# Patient Record
Sex: MALE | Race: WHITE | NOT HISPANIC OR LATINO | URBAN - METROPOLITAN AREA
[De-identification: names, ages, dates, MRNs, and addresses within clinical notes are randomized per-mention and may not be internally consistent; named-entity substitution may affect disease eponyms.]

---

## 2017-02-06 ENCOUNTER — OFFICE VISIT - HEALTHEAST (OUTPATIENT)
Dept: INTERNAL MEDICINE | Facility: CLINIC | Age: 77
End: 2017-02-06

## 2017-02-06 ENCOUNTER — AMBULATORY - HEALTHEAST (OUTPATIENT)
Dept: INTERNAL MEDICINE | Facility: CLINIC | Age: 77
End: 2017-02-06

## 2017-02-06 DIAGNOSIS — E78.5 HYPERLIPEMIA: ICD-10-CM

## 2017-02-06 DIAGNOSIS — Z12.5 SPECIAL SCREENING FOR MALIGNANT NEOPLASM OF PROSTATE: ICD-10-CM

## 2017-02-06 DIAGNOSIS — D64.9 ANEMIA, UNSPECIFIED: ICD-10-CM

## 2017-02-06 DIAGNOSIS — Z00.00 MEDICARE ANNUAL WELLNESS VISIT, INITIAL: ICD-10-CM

## 2017-02-06 DIAGNOSIS — Z79.899 ENCOUNTER FOR LONG-TERM (CURRENT) USE OF OTHER MEDICATIONS: ICD-10-CM

## 2017-02-06 DIAGNOSIS — F43.23 ADJUSTMENT DISORDER WITH MIXED ANXIETY AND DEPRESSED MOOD: ICD-10-CM

## 2017-02-06 DIAGNOSIS — R91.8 ABNORMALITY OF LUNG ON CXR: ICD-10-CM

## 2017-02-06 DIAGNOSIS — K21.9 ESOPHAGEAL REFLUX: ICD-10-CM

## 2017-02-06 DIAGNOSIS — F41.1 ANXIETY STATE: ICD-10-CM

## 2017-02-06 LAB
CHOLEST SERPL-MCNC: 202 MG/DL
FASTING STATUS PATIENT QL REPORTED: NO
HDLC SERPL-MCNC: 79 MG/DL
LDLC SERPL CALC-MCNC: 105 MG/DL
PSA SERPL-MCNC: 23.7 NG/ML (ref 0–6.5)
TRIGL SERPL-MCNC: 89 MG/DL

## 2017-02-06 ASSESSMENT — MIFFLIN-ST. JEOR: SCORE: 1418.87

## 2017-02-07 ENCOUNTER — COMMUNICATION - HEALTHEAST (OUTPATIENT)
Dept: INTERNAL MEDICINE | Facility: CLINIC | Age: 77
End: 2017-02-07

## 2017-02-12 ENCOUNTER — COMMUNICATION - HEALTHEAST (OUTPATIENT)
Dept: INTERNAL MEDICINE | Facility: CLINIC | Age: 77
End: 2017-02-12

## 2017-02-13 ENCOUNTER — COMMUNICATION - HEALTHEAST (OUTPATIENT)
Dept: INTERNAL MEDICINE | Facility: CLINIC | Age: 77
End: 2017-02-13

## 2017-02-13 DIAGNOSIS — F43.22 ADJUSTMENT DISORDER WITH ANXIETY: ICD-10-CM

## 2017-02-16 ENCOUNTER — COMMUNICATION - HEALTHEAST (OUTPATIENT)
Dept: INTERNAL MEDICINE | Facility: CLINIC | Age: 77
End: 2017-02-16

## 2017-02-16 DIAGNOSIS — F43.22 ADJUSTMENT DISORDER WITH ANXIETY: ICD-10-CM

## 2017-02-16 RX ORDER — CLONAZEPAM 0.5 MG/1
0.5 TABLET ORAL 2 TIMES DAILY PRN
Qty: 60 TABLET | Refills: 0 | Status: SHIPPED | OUTPATIENT
Start: 2017-02-16

## 2017-02-18 ENCOUNTER — COMMUNICATION - HEALTHEAST (OUTPATIENT)
Dept: INTERNAL MEDICINE | Facility: CLINIC | Age: 77
End: 2017-02-18

## 2017-02-26 ENCOUNTER — AMBULATORY - HEALTHEAST (OUTPATIENT)
Dept: INTERNAL MEDICINE | Facility: CLINIC | Age: 77
End: 2017-02-26

## 2017-05-13 ENCOUNTER — COMMUNICATION - HEALTHEAST (OUTPATIENT)
Dept: INTERNAL MEDICINE | Facility: CLINIC | Age: 77
End: 2017-05-13

## 2017-05-13 DIAGNOSIS — K21.9 ESOPHAGEAL REFLUX: ICD-10-CM

## 2017-05-29 ENCOUNTER — COMMUNICATION - HEALTHEAST (OUTPATIENT)
Dept: INTERNAL MEDICINE | Facility: CLINIC | Age: 77
End: 2017-05-29

## 2017-05-30 ENCOUNTER — RECORDS - HEALTHEAST (OUTPATIENT)
Dept: ADMINISTRATIVE | Facility: OTHER | Age: 77
End: 2017-05-30

## 2021-05-30 ENCOUNTER — RECORDS - HEALTHEAST (OUTPATIENT)
Dept: ADMINISTRATIVE | Facility: CLINIC | Age: 81
End: 2021-05-30

## 2021-05-30 VITALS — WEIGHT: 154.9 LBS | HEIGHT: 70 IN | BODY MASS INDEX: 22.18 KG/M2

## 2021-06-08 NOTE — PROGRESS NOTES
Assessment and Plan:     1. Medicare annual wellness visit, initial     2. Abnormality of lung on CXR  XR Chest PA and Lateral   3. Hyperlipemia  Lipid Cascade   4. Anxiety     5. Esophageal reflux     6. Anemia     7. Adjustment Disorder With Anxiety And Depressed Mood     8. Encounter for long-term (current) use of other medications  Comprehensive Metabolic Panel    HM2(CBC w/o Differential)   9. Special screening for malignant neoplasm of prostate  PSA (Prostatic-Specific Antigen), Annual Screen         The patient's current medical problems were reviewed.      The following health maintenance schedule was reviewed with the patient and provided in printed form in the after visit summary:   Health Maintenance   Topic Date Due     ADVANCE DIRECTIVES DISCUSSED WITH PATIENT  10/04/2017     FALL RISK ASSESSMENT  02/06/2018     COLONOSCOPY  06/17/2023     TD 18+ HE  01/25/2026     PNEUMOCOCCAL POLYSACCHARIDE VACCINE AGE 65 AND OVER  Completed     INFLUENZA VACCINE RULE BASED  Completed     PNEUMOCOCCAL CONJUGATE VACCINE FOR ADULTS (PCV13 OR PREVNAR)  Completed     ZOSTER VACCINE  Completed        Subjective:   Chief Complaint: William Bear is an 76 y.o. male here for an Annual Wellness visit.   HPI:    1.  Health maintenance - immunizations are up-to-date.  Colonoscopy issues more complicated so see problem #2 below.    2.  Personal history of colon polyps - in 2003 the patient had a colonoscopy which was extensively short colon polyps.  In 2008 and then in June 2013 he had 2 additional colonoscopies which have not shown polyps.  Therefore he needs to [facility] to see for certain if he had an adenomatous polyp in 2003.  At issue is whether he should continue colonoscopies beyond age 76 or not.  We discussed the rationale for continuing or not continuing.    3.  Major depression poorly controlled  -I do not follow this personally.  Dr. Dumas is a psychiatrist and they have  tried a host of options.  Nothing seems to be effective and he will be seeing his psychiatrist next week.    4.  Essential tremor - the patient was given propranolol to use on a when necessary basis by his psychiatrist and that is appropriate.    5.  Chronic cough and abnormal chest x-ray - couple months ago he had equivocal left upper lobe infiltrate versus mass a repeat chest x-ray will needed to be done.  We did it today and reviewed the chest x-ray together.  My interpretation is that his chest x-ray is clear.  However my interpretation is unofficial until we get the report from [facility].  If they feel that he still has infiltrate that he will need a CT of the chest.    6.  Alcohol overuse - on his questionnaire he specified that he had 3-4 alcoholic beverages per day.  It's actually more like 2 1/5 alcoholic beverages per day.  He has a bottle of beer every day.  He has a glass of wine is maybe 6 or 7 ounces as opposed to the standard 5 ounce poor.  Therefore he is having about 2-1/4 drinks per day.  Given his depression this may be a negative factor.  This will be the outside limited what would you do generally considered acceptable on a long-term basis.    7.  GERD - patient continues to take his omeprazole and does not identify classic reflux.  Therefore were not certain that his chronic cough is related to reflux.  He has stopped his Flonase nasal spray which could've been a contributing factor to his cough.       Review of Systems:  Please see above.  The rest of the review of systems are negative for all systems.    Patient Care Team:  Irwin King MD as PCP - General     Patient Active Problem List   Diagnosis     Esophageal Reflux     Anemia     Anxiety     Male Erectile Disorder Due To Physical Condition     Adjustment Disorder With Anxiety And Depressed Mood     Hyperlipemia     Past Medical History:   Diagnosis Date     Adjustment disorder with mixed anxiety and depressed mood      Anemia       Anxiety      Basal Cell Carcinoma Of The Skin     Created by Conversion      Benign tubular adenoma of large intestine      Benign Tubular Adenoma Of The Large Intestine     Created by Conversion      ED (erectile dysfunction)      Elevated PSA      Esophageal reflux      Glaucoma      Hyperlipidemia      Hypertension      Inguinal Hernia     Created by Conversion      Macular degeneration      Serology Prostate-specific Antigen (PSA) Elevated     Created by Conversion [facility] Annotation: Dec  5 2007  1:23PM - Irwin King: bx = benign       Past Surgical History:   Procedure Laterality Date     MO BIOPSY OF PROSTATE,NEEDLE/PUNCH      Description: Biopsy Of The Prostate Needle;  Recorded: 08/28/2008;     MO LAMNOTMY INCL W/DCMPRSN NRV ROOT 1 INTRSPC LUMBR      Description: Hemilaminectomy With Disc Removal One Lumbar Interspace;  Recorded: 08/28/2008;  Comments: L3-4     MO REMOVE TONSILS/ADENOIDS,<11 Y/O      Description: Tonsillectomy With Adenoidectomy;  Recorded: 08/28/2008;     TRANSURETHRAL RESECTION OF PROSTATE        No family history on file.   Social History     Social History     Marital status: Single     Spouse name: N/A     Number of children: N/A     Years of education: N/A     Occupational History     Not on file.     Social History Main Topics     Smoking status: Former Smoker     Quit date: 2/8/1975     Smokeless tobacco: Not on file     Alcohol use 1.2 oz/week     1 Glasses of wine, 1 Cans of beer per week      Comment: Daily     Drug use: No     Sexual activity: Not on file     Other Topics Concern     Not on file     Social History Narrative      Current Outpatient Prescriptions   Medication Sig Dispense Refill     atorvastatin (LIPITOR) 20 MG tablet One tablet daily at bedtime 90 tablet 3     cholecalciferol, vitamin D3, 1,000 unit tablet Take 1,000 Units by mouth daily.       clonazePAM (KLONOPIN) 0.5 MG tablet TAKE 1 TABLET BY MOUTH TWICE DAILY AS NEEDED 60 tablet 5     fluticasone  "(FLONASE) 50 mcg/actuation nasal spray 2 sprays each nostril daily 16 g 12     mirtazapine (REMERON) 15 MG tablet TAKE 1 TABLET BY MOUTH EVERY DAY 90 tablet 3     MULTIVITAMIN ORAL Take 1 tablet by mouth daily.       omega-3 fatty acids-vitamin E (FISH OIL) 1,000 mg cap Take 1 capsule by mouth daily.       omeprazole (PRILOSEC) 20 MG capsule TAKE ONE CAPSULE BY MOUTH DAILY 90 capsule 3     sildenafil (VIAGRA) 50 MG tablet Take 1 tablet (50 mg total) by mouth daily as needed. 10 tablet 12     travoprost, benzalkonium, (TRAVATAN) 0.004 % ophthalmic solution 1 drop bedtime.       venlafaxine (EFFEXOR) 75 MG tablet Take 75 mg by mouth 2 (two) times a day.       VIT A/VIT C/VIT E/ZINC/COPPER (ICAPS AREDS ORAL) Take 1 capsule by mouth 2 (two) times a day.        No current facility-administered medications for this visit.       Objective:   Vital Signs:   Visit Vitals     Ht 5' 10\" (1.778 m)     Wt 154 lb 14.4 oz (70.3 kg)     BMI 22.23 kg/m2        VisionScreening:   Visual Acuity Screening    Right eye Left eye Both eyes   Without correction:      With correction: 10/20 10/30 10/15        PHYSICAL EXAM:    EYES: Eyelids, conjunctiva, and sclera were normal. Pupils were normal.  HEAD, EARS, NOSE, MOUTH, AND THROAT: Head and face were normal. Hearing was normal to voice and the ears were normal to exam. Nose appearance was normal and there was no discharge. Oropharynx was normal.  NECK: Neck appearance was normal. There was no cervical adenopathy.  RESPIRATORY: Breathing pattern was normal and the chest moved symmetrically.  Lungs were clear to auscultaion without rales or wheezing.   CARDIOVASCULAR: Heart rate and rhythm were normal.  S1 and S2 were normal and there were no extra sounds or murmurs.   GASTROINTESTINAL: The abdomen was normal in contour.  Bowel sounds were present.  No hepatomegaly or splenomegaly. No localized tenderness, rebound or guarding.  MUSCULOSKELETAL: Skeletal configuration was normal and muscle " mass was normal for age. Joint appearance was overall normal.  LYMPHATIC: There were no enlarged nodes.  SKIN/HAIR/NAILS: Skin color was normal.  There were no skin lesions.  Hair and nails were normal.  EXTREMITIES: No peripheral edema. DP/PT pulses were normal.  NEUROLOGIC: The patient was alert and oriented to person, place, time, and circumstance. Speech was normal. Cranial nerves were normal. Motor strength was normal for age. The patient was normally coordinated.  PSYCHIATRIC:  Mood and affect were normal and the patient had normal recent and remote memory. The patient's judgment and insight were normal.  GENITALIA: Normal circumcised male. No evidence of inguinal hernia.  RECTAL: Sphincter tone was normal with no hemorrhoids present. Prostate smooth and firm with no nodules palpated.  Prostate moderately enlarged.          Assessment Results 2/6/2017   Activities of Daily Living No help needed   Instrumental Activities of Daily Living No help needed   Get Up and Go Score Less than 12 seconds   Mini Cog Total Score 5     A Mini-Cog score of 0-2 suggests the possibility of dementia, score of 3-5 suggests no dementia    Identified Health Risks:     The patient reports that he drinks more than one alcoholic drink per day but denies binge or excessive drinking. He was counseled and given information about possible harmful effects of excessive alcohol intake.  The patient was provided with written information regarding signs of hearing loss.  The patient's MCKAYLA-7 score is consistent with probably anxiety disorder.  He was provided with patient information regarding anxiety and was advised to set up a follow up appointment in 2 weeks to further address this issue with his psychiatrist with whom he already has an appointment.  He is at risk for falling and has been provided with information to reduce the risk of falling at home.  Patient's advanced directive was discussed and I am comfortable with the patient's  wishes.

## 2021-06-15 PROBLEM — E78.5 HYPERLIPEMIA: Status: ACTIVE | Noted: 2017-02-06
